# Patient Record
Sex: FEMALE | Race: BLACK OR AFRICAN AMERICAN | NOT HISPANIC OR LATINO | Employment: STUDENT | URBAN - METROPOLITAN AREA
[De-identification: names, ages, dates, MRNs, and addresses within clinical notes are randomized per-mention and may not be internally consistent; named-entity substitution may affect disease eponyms.]

---
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
## 2022-03-15 ENCOUNTER — TELEPHONE (OUTPATIENT)
Dept: OBSTETRICS AND GYNECOLOGY | Facility: CLINIC | Age: 22
End: 2022-03-15
Payer: COMMERCIAL

## 2022-03-15 NOTE — TELEPHONE ENCOUNTER
norelgestromin-ethinyl estradiol (ORTHO EVRA) 150-35 mcg/24 hr 3 patch 8 8/17/2021 8/17/2022    Sig - Route: Place 1 patch onto the skin once a week. - Transdermal    Sent to pharmacy as: norelgestromin-ethinyl estradiol (ORTHO EVRA) 150-35 mcg/24 hr    E-Prescribing Status: Receipt confirmed by pharmacy (8/17/2021  9:06 AM CDT)    Associated Diagnoses    Encounter for initial prescription of transdermal patch hormonal contraceptive device  - Primary         Pharmacy    [id number] - [place], [place] - [address] AT [facility] OF [address]     Called pharmacy and script will be filled through May.

## 2022-03-15 NOTE — TELEPHONE ENCOUNTER
----- Message from Amira Hills sent at 3/15/2022 11:32 AM CDT -----  Pt is calling for a 90 day refill of birth control (xulane. Call back number is [phone]. Thx. El

## 2022-03-15 NOTE — TELEPHONE ENCOUNTER
Called patient and pharmacy in [place] telling her she does not have refills.  Prescription sent in August with refills.  Will call pharmacy.  Scheduled appointment for annual in May.

## 2022-05-10 ENCOUNTER — TELEPHONE (OUTPATIENT)
Dept: OBSTETRICS AND GYNECOLOGY | Facility: CLINIC | Age: 22
End: 2022-05-10
Payer: COMMERCIAL

## 2022-05-10 ENCOUNTER — PATIENT MESSAGE (OUTPATIENT)
Dept: OBSTETRICS AND GYNECOLOGY | Facility: CLINIC | Age: 22
End: 2022-05-10
Payer: COMMERCIAL

## 2022-05-10 NOTE — TELEPHONE ENCOUNTER
Called pt to inform appointment sched 5/16 will have to be rescheduled dur to Dr. Remy being out of the office. No answer and vm is full will send message via patient portal as well.

## 2022-05-17 ENCOUNTER — OFFICE VISIT (OUTPATIENT)
Dept: OBSTETRICS AND GYNECOLOGY | Facility: CLINIC | Age: 22
End: 2022-05-17
Payer: COMMERCIAL

## 2022-05-17 ENCOUNTER — LAB VISIT (OUTPATIENT)
Dept: LAB | Facility: HOSPITAL | Age: 22
End: 2022-05-17
Attending: NURSE PRACTITIONER
Payer: COMMERCIAL

## 2022-05-17 VITALS
WEIGHT: 102.06 LBS | DIASTOLIC BLOOD PRESSURE: 72 MMHG | SYSTOLIC BLOOD PRESSURE: 98 MMHG | BODY MASS INDEX: 18.08 KG/M2 | HEIGHT: 63 IN

## 2022-05-17 DIAGNOSIS — Z01.419 ENCOUNTER FOR GYNECOLOGICAL EXAMINATION WITHOUT ABNORMAL FINDING: Primary | ICD-10-CM

## 2022-05-17 DIAGNOSIS — Z23 NEED FOR HPV VACCINATION: ICD-10-CM

## 2022-05-17 DIAGNOSIS — Z01.419 ENCOUNTER FOR GYNECOLOGICAL EXAMINATION WITHOUT ABNORMAL FINDING: ICD-10-CM

## 2022-05-17 DIAGNOSIS — Z30.45 ENCOUNTER FOR SURVEILLANCE OF TRANSDERMAL PATCH HORMONAL CONTRACEPTIVE DEVICE: ICD-10-CM

## 2022-05-17 DIAGNOSIS — Z11.3 ENCOUNTER FOR SCREENING FOR INFECTIONS WITH PREDOMINANTLY SEXUAL MODE OF TRANSMISSION: ICD-10-CM

## 2022-05-17 PROBLEM — E55.9 VITAMIN D DEFICIENCY: Status: ACTIVE | Noted: 2021-04-30

## 2022-05-17 PROBLEM — G43.909 MIGRAINE: Status: ACTIVE | Noted: 2022-05-17

## 2022-05-17 PROCEDURE — 1159F PR MEDICATION LIST DOCUMENTED IN MEDICAL RECORD: ICD-10-PCS | Mod: CPTII,S$GLB,, | Performed by: NURSE PRACTITIONER

## 2022-05-17 PROCEDURE — 87389 HIV-1 AG W/HIV-1&-2 AB AG IA: CPT | Performed by: NURSE PRACTITIONER

## 2022-05-17 PROCEDURE — 3078F PR MOST RECENT DIASTOLIC BLOOD PRESSURE < 80 MM HG: ICD-10-PCS | Mod: CPTII,S$GLB,, | Performed by: NURSE PRACTITIONER

## 2022-05-17 PROCEDURE — 90651 9VHPV VACCINE 2/3 DOSE IM: CPT | Mod: S$GLB,,, | Performed by: NURSE PRACTITIONER

## 2022-05-17 PROCEDURE — 99395 PREV VISIT EST AGE 18-39: CPT | Mod: 25,S$GLB,, | Performed by: NURSE PRACTITIONER

## 2022-05-17 PROCEDURE — 36415 COLL VENOUS BLD VENIPUNCTURE: CPT | Mod: PO | Performed by: NURSE PRACTITIONER

## 2022-05-17 PROCEDURE — 3074F PR MOST RECENT SYSTOLIC BLOOD PRESSURE < 130 MM HG: ICD-10-PCS | Mod: CPTII,S$GLB,, | Performed by: NURSE PRACTITIONER

## 2022-05-17 PROCEDURE — 99999 PR PBB SHADOW E&M-EST. PATIENT-LVL III: ICD-10-PCS | Mod: PBBFAC,,, | Performed by: NURSE PRACTITIONER

## 2022-05-17 PROCEDURE — 99999 PR PBB SHADOW E&M-EST. PATIENT-LVL III: CPT | Mod: PBBFAC,,, | Performed by: NURSE PRACTITIONER

## 2022-05-17 PROCEDURE — 80074 ACUTE HEPATITIS PANEL: CPT | Performed by: NURSE PRACTITIONER

## 2022-05-17 PROCEDURE — 87491 CHLMYD TRACH DNA AMP PROBE: CPT | Performed by: NURSE PRACTITIONER

## 2022-05-17 PROCEDURE — 90471 HPV VACCINE 9-VALENT 3 DOSE IM: ICD-10-PCS | Mod: S$GLB,,, | Performed by: NURSE PRACTITIONER

## 2022-05-17 PROCEDURE — 90651 HPV VACCINE 9-VALENT 3 DOSE IM: ICD-10-PCS | Mod: S$GLB,,, | Performed by: NURSE PRACTITIONER

## 2022-05-17 PROCEDURE — 1159F MED LIST DOCD IN RCRD: CPT | Mod: CPTII,S$GLB,, | Performed by: NURSE PRACTITIONER

## 2022-05-17 PROCEDURE — 3008F BODY MASS INDEX DOCD: CPT | Mod: CPTII,S$GLB,, | Performed by: NURSE PRACTITIONER

## 2022-05-17 PROCEDURE — 99395 PR PREVENTIVE VISIT,EST,18-39: ICD-10-PCS | Mod: 25,S$GLB,, | Performed by: NURSE PRACTITIONER

## 2022-05-17 PROCEDURE — 90471 IMMUNIZATION ADMIN: CPT | Mod: S$GLB,,, | Performed by: NURSE PRACTITIONER

## 2022-05-17 PROCEDURE — 3008F PR BODY MASS INDEX (BMI) DOCUMENTED: ICD-10-PCS | Mod: CPTII,S$GLB,, | Performed by: NURSE PRACTITIONER

## 2022-05-17 PROCEDURE — 3078F DIAST BP <80 MM HG: CPT | Mod: CPTII,S$GLB,, | Performed by: NURSE PRACTITIONER

## 2022-05-17 PROCEDURE — 3074F SYST BP LT 130 MM HG: CPT | Mod: CPTII,S$GLB,, | Performed by: NURSE PRACTITIONER

## 2022-05-17 PROCEDURE — 87591 N.GONORRHOEAE DNA AMP PROB: CPT | Performed by: NURSE PRACTITIONER

## 2022-05-17 PROCEDURE — 86592 SYPHILIS TEST NON-TREP QUAL: CPT | Performed by: NURSE PRACTITIONER

## 2022-05-17 RX ORDER — NORELGESTROMIN AND ETHINYL ESTRADIOL 35; 150 UG/MG; UG/MG
1 PATCH TRANSDERMAL WEEKLY
Qty: 9 PATCH | Refills: 4 | Status: SHIPPED | OUTPATIENT
Start: 2022-05-17 | End: 2023-03-20

## 2022-05-17 NOTE — PROGRESS NOTES
After identifying patient with 2 identifiers, verifying that patient wished to receive gardasil hpv vaccine, reviewing allergies, gardasil  administered to left deltoid.   Patient tolerated well.  Patient instructed to wait 15 minutes and verbalized understanding.

## 2022-05-17 NOTE — PROGRESS NOTES
Subjective:       Patient ID: Harini Mcbride is a 21 y.o. female.    Chief Complaint:  Well Woman      History of Present Illness  HPI  Annual Exam-Premenopausal  G0 presents for annual exam. The patient has no complaints today. The patient is sexually active since November; x1 partner; MM relationship; no condoms.  Does desire STD testing today; needs refill on her patches. GYN screening history: no prior history of gyn screening tests. The patient wears seatbelts: yes. The patient participates in regular exercise: no. Has the patient ever been transfused or tattooed?: yes. x5 tattoos.  The patient reports that there is not domestic violence in her life.    Monthly menses the week of no patch x5d; moderate in the middle; pads-overnight changing q5-6h for cleanliness; cramping first two days; second day worst. No meds. Heating pad with relief.      Two gardasil doses; needs third dose.    ArthroCAD major at [employer]    GYN & OB History  Patient's last menstrual period was 2022 (approximate).   Date of Last Pap: No result found    OB History    Para Term  AB Living   0 0 0 0 0 0   SAB IAB Ectopic Multiple Live Births   0 0 0 0 0       Review of Systems  Review of Systems   Constitutional: Negative for activity change, appetite change, chills, fatigue and fever.   HENT: Negative for nasal congestion and mouth sores.    Respiratory: Negative for cough, shortness of breath and wheezing.    Cardiovascular: Negative for chest pain.   Gastrointestinal: Negative for abdominal pain, constipation, diarrhea, nausea and vomiting.   Endocrine: Negative for hair loss and hot flashes.   Genitourinary: Positive for dysmenorrhea. Negative for bladder incontinence, decreased libido, dyspareunia, dysuria, frequency, genital sores, menorrhagia, menstrual problem, pelvic pain, urgency, vaginal discharge, vaginal pain, urinary incontinence, postcoital bleeding and vaginal odor.   Musculoskeletal:  Negative for back pain.   Integumentary:  Negative for breast mass, nipple discharge, breast skin changes and breast tenderness.   Neurological: Negative for headaches.   Hematological: Negative for adenopathy.   Psychiatric/Behavioral: Negative for depression. The patient is not nervous/anxious.    All other systems reviewed and are negative.  Breast: Negative for breast self exam, lump, mass, mastodynia, nipple discharge, skin changes and tenderness          Objective:      Physical Exam:   Constitutional: She is oriented to person, place, and time. She appears well-developed and well-nourished. No distress.    HENT:   Head: Normocephalic and atraumatic.   Nose: Nose normal.    Eyes: Pupils are equal, round, and reactive to light. Conjunctivae and EOM are normal. Right eye exhibits no discharge. Left eye exhibits no discharge.     Cardiovascular: Normal rate, regular rhythm and normal heart sounds.  Exam reveals no gallop, no friction rub, no clubbing, no cyanosis and no edema.    No murmur heard.   Pulmonary/Chest: Effort normal and breath sounds normal. No respiratory distress. She has no decreased breath sounds. She has no wheezes. She has no rhonchi. She has no rales. She exhibits no tenderness. Right breast exhibits no inverted nipple, no mass, no nipple discharge, no skin change, no tenderness, no bleeding and no swelling. Left breast exhibits no inverted nipple, no mass, no nipple discharge, no skin change, no tenderness, no bleeding and no swelling. Breasts are symmetrical.        Abdominal: Soft. Bowel sounds are normal. She exhibits no distension. There is no abdominal tenderness. There is no rebound and no guarding. Hernia confirmed negative in the right inguinal area and confirmed negative in the left inguinal area.     Genitourinary:    Inguinal canal, vagina, uterus, right adnexa and left adnexa normal.   Rectum:      No external hemorrhoid.      Pelvic exam was performed with patient supine.   The  external female genitalia was normal.   No external genitalia lesions identified,Genitalia hair distrobution normal .   Labial bartholins normal.There is no rash, tenderness, lesion or injury on the right labia. There is no rash, tenderness, lesion or injury on the left labia. Cervix is normal. Right adnexum displays no mass, no tenderness and no fullness. Left adnexum displays no mass, no tenderness and no fullness. No erythema,  no vaginal discharge, tenderness or bleeding in the vagina.    No foreign body in the vagina.      No signs of injury in the vagina.   Vagina was moist.Cervix exhibits no motion tenderness, no lesion, no discharge, no friability, no lesion, no tenderness and no polyp.    pap smear completedUerus contour normal  Uterus is not enlarged and not tender. Uterus size: 6 cm.Normal urethral meatus.Urethral Meatus exhibits: urethral lesion and prolapsedUrethra findings: no urethral mass, no tenderness and no urethral scarringBladder findings: no bladder distention and no bladder tenderness          Musculoskeletal: Normal range of motion and moves all extremeties.      Lymphadenopathy: No inguinal adenopathy noted on the right or left side.    Neurological: She is alert and oriented to person, place, and time.    Skin: Skin is warm and dry. No rash noted. She is not diaphoretic. No cyanosis or erythema. No pallor. Nails show no clubbing.    Psychiatric: She has a normal mood and affect. Her speech is normal and behavior is normal. Judgment and thought content normal.             Assessment:        1. Encounter for gynecological examination without abnormal finding    2. Encounter for surveillance of transdermal patch hormonal contraceptive device    3. Need for HPV vaccination    4. Encounter for screening for infections with predominantly sexual mode of transmission               Plan:   Discussed risks of DVT development with birth control use.  Pt denies history of blood clots, DVT, cardiac  issues, HTN, CVA, gallbladder disease, liver disease, smoking, migraines with an aura, or adrenal disease.  Pt denies family hx of DVT.    Refill sent on patches    Safe sex practices discussed.    Educated pt on HPV and Gardasil vaccine; last injection given today.  Safe sex practices discussed.     Reviewed updated recommendations for pap smears (every 3 years) in low risk patients.  Recommend annual pelvic exams.  Reviewed recommendations for annual CBE.  First pap at 22yo.   RTC 1 year or sooner prn.  To PCP for other health maintenance.      Encounter for gynecological examination without abnormal finding  -     HIV 1/2 Ag/Ab (4th Gen); Future; Expected date: 05/17/2022  -     RPR; Future; Expected date: 05/17/2022  -     Hepatitis Panel, Acute; Future; Expected date: 05/17/2022  -     C. trachomatis/N. gonorrhoeae by AMP DNA    Encounter for surveillance of transdermal patch hormonal contraceptive device  -     norelgestromin-ethinyl estradiol (ORTHO EVRA) 150-35 mcg/24 hr; Place 1 patch onto the skin once a week.  Dispense: 9 patch; Refill: 4    Need for HPV vaccination  -     (In Office Administered) HPV Vaccine (9-Valent) (3 Dose) (IM)    Encounter for screening for infections with predominantly sexual mode of transmission  -     HIV 1/2 Ag/Ab (4th Gen); Future; Expected date: 05/17/2022  -     RPR; Future; Expected date: 05/17/2022  -     Hepatitis Panel, Acute; Future; Expected date: 05/17/2022  -     C. trachomatis/N. gonorrhoeae by AMP DNA

## 2022-05-18 LAB
C TRACH DNA SPEC QL NAA+PROBE: NOT DETECTED
N GONORRHOEA DNA SPEC QL NAA+PROBE: NOT DETECTED
RPR SER QL: NORMAL

## 2022-05-20 LAB
HAV IGM SERPL QL IA: NEGATIVE
HBV CORE IGM SERPL QL IA: NEGATIVE
HBV SURFACE AG SERPL QL IA: NEGATIVE
HCV AB SERPL QL IA: NEGATIVE
HIV 1+2 AB+HIV1 P24 AG SERPL QL IA: NEGATIVE

## 2022-11-28 ENCOUNTER — OFFICE VISIT (OUTPATIENT)
Dept: OBSTETRICS AND GYNECOLOGY | Facility: CLINIC | Age: 22
End: 2022-11-28
Payer: COMMERCIAL

## 2022-11-28 VITALS
SYSTOLIC BLOOD PRESSURE: 118 MMHG | DIASTOLIC BLOOD PRESSURE: 72 MMHG | BODY MASS INDEX: 17.36 KG/M2 | WEIGHT: 98 LBS | HEIGHT: 63 IN

## 2022-11-28 DIAGNOSIS — N76.0 ACUTE VAGINITIS: Primary | ICD-10-CM

## 2022-11-28 PROCEDURE — 3074F SYST BP LT 130 MM HG: CPT | Mod: CPTII,S$GLB,, | Performed by: NURSE PRACTITIONER

## 2022-11-28 PROCEDURE — 3078F DIAST BP <80 MM HG: CPT | Mod: CPTII,S$GLB,, | Performed by: NURSE PRACTITIONER

## 2022-11-28 PROCEDURE — 3078F PR MOST RECENT DIASTOLIC BLOOD PRESSURE < 80 MM HG: ICD-10-PCS | Mod: CPTII,S$GLB,, | Performed by: NURSE PRACTITIONER

## 2022-11-28 PROCEDURE — 1160F PR REVIEW ALL MEDS BY PRESCRIBER/CLIN PHARMACIST DOCUMENTED: ICD-10-PCS | Mod: CPTII,S$GLB,, | Performed by: NURSE PRACTITIONER

## 2022-11-28 PROCEDURE — 99213 PR OFFICE/OUTPT VISIT, EST, LEVL III, 20-29 MIN: ICD-10-PCS | Mod: S$GLB,,, | Performed by: NURSE PRACTITIONER

## 2022-11-28 PROCEDURE — 3074F PR MOST RECENT SYSTOLIC BLOOD PRESSURE < 130 MM HG: ICD-10-PCS | Mod: CPTII,S$GLB,, | Performed by: NURSE PRACTITIONER

## 2022-11-28 PROCEDURE — 99999 PR PBB SHADOW E&M-EST. PATIENT-LVL III: CPT | Mod: PBBFAC,,, | Performed by: NURSE PRACTITIONER

## 2022-11-28 PROCEDURE — 3008F BODY MASS INDEX DOCD: CPT | Mod: CPTII,S$GLB,, | Performed by: NURSE PRACTITIONER

## 2022-11-28 PROCEDURE — 81514 NFCT DS BV&VAGINITIS DNA ALG: CPT | Performed by: NURSE PRACTITIONER

## 2022-11-28 PROCEDURE — 99213 OFFICE O/P EST LOW 20 MIN: CPT | Mod: S$GLB,,, | Performed by: NURSE PRACTITIONER

## 2022-11-28 PROCEDURE — 3008F PR BODY MASS INDEX (BMI) DOCUMENTED: ICD-10-PCS | Mod: CPTII,S$GLB,, | Performed by: NURSE PRACTITIONER

## 2022-11-28 PROCEDURE — 1160F RVW MEDS BY RX/DR IN RCRD: CPT | Mod: CPTII,S$GLB,, | Performed by: NURSE PRACTITIONER

## 2022-11-28 PROCEDURE — 1159F PR MEDICATION LIST DOCUMENTED IN MEDICAL RECORD: ICD-10-PCS | Mod: CPTII,S$GLB,, | Performed by: NURSE PRACTITIONER

## 2022-11-28 PROCEDURE — 87491 CHLMYD TRACH DNA AMP PROBE: CPT | Performed by: NURSE PRACTITIONER

## 2022-11-28 PROCEDURE — 99999 PR PBB SHADOW E&M-EST. PATIENT-LVL III: ICD-10-PCS | Mod: PBBFAC,,, | Performed by: NURSE PRACTITIONER

## 2022-11-28 PROCEDURE — 1159F MED LIST DOCD IN RCRD: CPT | Mod: CPTII,S$GLB,, | Performed by: NURSE PRACTITIONER

## 2022-11-28 PROCEDURE — 87591 N.GONORRHOEAE DNA AMP PROB: CPT | Performed by: NURSE PRACTITIONER

## 2022-11-28 NOTE — PROGRESS NOTES
Subjective:       Patient ID: Harini Mcbride is a 21 y.o. female.    Chief Complaint:  Vaginitis      History of Present Illness  G0 present for multiple complaints  C/o vaginal discharge third or fourth yeast infection since 12/2021  X1 partner; no condoms lately  Showers with dove bar  White, milky discharge; itching and burning  No odor  Urgent care sent diflucan with temporary relief  Wondering about pregnancy  Applied patch too soon and menses was intermittent  Last noticed spotting yesterday with cramping  Denies any other pregnancy sx; x2 neg home UPTs  Desires beta today      GYN & OB History  Patient's last menstrual period was 2022.   Date of Last Pap: No result found    OB History    Para Term  AB Living   0 0 0 0 0 0   SAB IAB Ectopic Multiple Live Births   0 0 0 0 0       Review of Systems  Review of Systems   Constitutional:  Negative for chills, fatigue and fever.   Gastrointestinal:  Positive for constipation. Negative for abdominal pain, diarrhea, nausea and vomiting.   Genitourinary:  Positive for dysuria, menstrual problem, vaginal discharge and vaginal odor. Negative for dysmenorrhea, flank pain, frequency, hematuria, menorrhagia, pelvic pain, urgency, vaginal pain and urinary incontinence.   Musculoskeletal:  Negative for back pain.   Integumentary:  Negative for rash and breast tenderness.   Neurological:  Negative for headaches.   Psychiatric/Behavioral:  Negative for sleep disturbance.    All other systems reviewed and are negative.  Breast: Negative for tenderness        Objective:      Physical Exam:   Constitutional: She is oriented to person, place, and time. She appears well-developed and well-nourished. No distress.    HENT:   Head: Normocephalic and atraumatic.    Eyes: Pupils are equal, round, and reactive to light. Conjunctivae and EOM are normal.     Cardiovascular:  Normal rate.             Pulmonary/Chest: Effort normal.        Abdominal: Soft. She  exhibits no distension. There is no abdominal tenderness. There is no rebound and no guarding. Hernia confirmed negative in the right inguinal area and confirmed negative in the left inguinal area.     Genitourinary:    Inguinal canal normal.   Rectum:      No external hemorrhoid.      Pelvic exam was performed with patient supine.   The external female genitalia was normal.   No external genitalia lesions identified,Genitalia hair distrobution normal .   Labial bartholins normal.There is no rash, tenderness, lesion or injury on the right labia. There is no rash, tenderness, lesion or injury on the left labia. There is vaginal discharge (cloudy, thin discharge; no odor) in the vagina. No erythema, tenderness or bleeding in the vagina.    No foreign body in the vagina.      No signs of injury in the vagina.   Normal urethral meatus.Urethral Meatus exhibits: urethral lesion and prolapsedUrethra findings: no urethral mass, no tenderness and no urethral scarring   Genitourinary Comments: Bimanual deferred per pt             Musculoskeletal: Normal range of motion and moves all extremeties.      Lymphadenopathy: No inguinal adenopathy noted on the right or left side.    Neurological: She is alert and oriented to person, place, and time.    Skin: Skin is warm and dry. No rash noted. She is not diaphoretic. No erythema. No pallor.    Psychiatric: She has a normal mood and affect. Her behavior is normal. Judgment and thought content normal.           Assessment:        1. Acute vaginitis               Plan:   Apply the patch to clean, dry skin on the Sunday after menstruation starts with one patch weekly alternating sites, avoiding breasts.  Fourth week no path.  Safe sex practices discussed.  Vaginal hygiene practices discussed.  Too soon for upt; offered beta; pt declines today would like to wait; will let me know    G/c and affirm collected    Continue annual well woman exam.    I spent a total of 20 minutes on the day  of the visit.This includes face to face time and non-face to face time preparing to see the patient (eg, review of tests), Obtaining and/or reviewing separately obtained history, Documenting clinical information in the electronic or other health record, Independently interpreting resultsand communicating results to the patient/family/caregiver, or Care coordination.      Acute vaginitis  -     Vaginosis Screen by DNA Probe  -     C. trachomatis/N. gonorrhoeae by AMP DNA

## 2022-11-30 LAB
BACTERIAL VAGINOSIS DNA: NEGATIVE
C TRACH DNA SPEC QL NAA+PROBE: NOT DETECTED
CANDIDA GLABRATA DNA: NEGATIVE
CANDIDA KRUSEI DNA: NEGATIVE
CANDIDA RRNA VAG QL PROBE: NEGATIVE
N GONORRHOEA DNA SPEC QL NAA+PROBE: NOT DETECTED
T VAGINALIS RRNA GENITAL QL PROBE: NEGATIVE

## 2023-03-19 DIAGNOSIS — Z30.45 ENCOUNTER FOR SURVEILLANCE OF TRANSDERMAL PATCH HORMONAL CONTRACEPTIVE DEVICE: ICD-10-CM

## 2023-03-20 RX ORDER — NORELGESTROMIN AND ETHINYL ESTRADIOL 150; 35 UG/D; UG/D
PATCH TRANSDERMAL
Qty: 9 PATCH | Refills: 0 | Status: SHIPPED | OUTPATIENT
Start: 2023-03-20 | End: 2023-05-31 | Stop reason: SDUPTHER

## 2023-03-21 NOTE — TELEPHONE ENCOUNTER
Called pt, confirmed pt identifiers scheduled pt for annual exam 5/22 at [facility], pt verbalized understanding.

## 2023-05-23 ENCOUNTER — TELEPHONE (OUTPATIENT)
Dept: OBSTETRICS AND GYNECOLOGY | Facility: CLINIC | Age: 23
End: 2023-05-23
Payer: COMMERCIAL

## 2023-05-23 NOTE — TELEPHONE ENCOUNTER
----- Message from Kobi Craven sent at 5/23/2023 11:22 AM CDT -----  Contact: [phone]  Pt is calling in regards to seeing if she still needs to keep her appt. Pt stated that her cycle is beginning to start. Please call pt back at [phone]. Thanks KB

## 2023-05-31 ENCOUNTER — OFFICE VISIT (OUTPATIENT)
Dept: OBSTETRICS AND GYNECOLOGY | Facility: CLINIC | Age: 23
End: 2023-05-31
Payer: COMMERCIAL

## 2023-05-31 VITALS
HEIGHT: 63 IN | DIASTOLIC BLOOD PRESSURE: 72 MMHG | SYSTOLIC BLOOD PRESSURE: 110 MMHG | BODY MASS INDEX: 18.08 KG/M2 | WEIGHT: 102.06 LBS

## 2023-05-31 DIAGNOSIS — Z01.419 ENCOUNTER FOR GYNECOLOGICAL EXAMINATION WITHOUT ABNORMAL FINDING: Primary | ICD-10-CM

## 2023-05-31 DIAGNOSIS — N76.0 ACUTE VAGINITIS: ICD-10-CM

## 2023-05-31 DIAGNOSIS — Z30.45 ENCOUNTER FOR SURVEILLANCE OF TRANSDERMAL PATCH HORMONAL CONTRACEPTIVE DEVICE: ICD-10-CM

## 2023-05-31 DIAGNOSIS — Z11.3 ENCOUNTER FOR SCREENING FOR INFECTIONS WITH PREDOMINANTLY SEXUAL MODE OF TRANSMISSION: ICD-10-CM

## 2023-05-31 PROCEDURE — 81514 NFCT DS BV&VAGINITIS DNA ALG: CPT | Performed by: NURSE PRACTITIONER

## 2023-05-31 PROCEDURE — 1159F MED LIST DOCD IN RCRD: CPT | Mod: CPTII,S$GLB,, | Performed by: NURSE PRACTITIONER

## 2023-05-31 PROCEDURE — 99999 PR PBB SHADOW E&M-EST. PATIENT-LVL III: CPT | Mod: PBBFAC,,, | Performed by: NURSE PRACTITIONER

## 2023-05-31 PROCEDURE — 3008F PR BODY MASS INDEX (BMI) DOCUMENTED: ICD-10-PCS | Mod: CPTII,S$GLB,, | Performed by: NURSE PRACTITIONER

## 2023-05-31 PROCEDURE — 99999 PR PBB SHADOW E&M-EST. PATIENT-LVL III: ICD-10-PCS | Mod: PBBFAC,,, | Performed by: NURSE PRACTITIONER

## 2023-05-31 PROCEDURE — 3074F SYST BP LT 130 MM HG: CPT | Mod: CPTII,S$GLB,, | Performed by: NURSE PRACTITIONER

## 2023-05-31 PROCEDURE — 87591 N.GONORRHOEAE DNA AMP PROB: CPT | Performed by: NURSE PRACTITIONER

## 2023-05-31 PROCEDURE — 99395 PR PREVENTIVE VISIT,EST,18-39: ICD-10-PCS | Mod: S$GLB,,, | Performed by: NURSE PRACTITIONER

## 2023-05-31 PROCEDURE — 3078F PR MOST RECENT DIASTOLIC BLOOD PRESSURE < 80 MM HG: ICD-10-PCS | Mod: CPTII,S$GLB,, | Performed by: NURSE PRACTITIONER

## 2023-05-31 PROCEDURE — 1159F PR MEDICATION LIST DOCUMENTED IN MEDICAL RECORD: ICD-10-PCS | Mod: CPTII,S$GLB,, | Performed by: NURSE PRACTITIONER

## 2023-05-31 PROCEDURE — 1160F PR REVIEW ALL MEDS BY PRESCRIBER/CLIN PHARMACIST DOCUMENTED: ICD-10-PCS | Mod: CPTII,S$GLB,, | Performed by: NURSE PRACTITIONER

## 2023-05-31 PROCEDURE — 3008F BODY MASS INDEX DOCD: CPT | Mod: CPTII,S$GLB,, | Performed by: NURSE PRACTITIONER

## 2023-05-31 PROCEDURE — 99395 PREV VISIT EST AGE 18-39: CPT | Mod: S$GLB,,, | Performed by: NURSE PRACTITIONER

## 2023-05-31 PROCEDURE — 3074F PR MOST RECENT SYSTOLIC BLOOD PRESSURE < 130 MM HG: ICD-10-PCS | Mod: CPTII,S$GLB,, | Performed by: NURSE PRACTITIONER

## 2023-05-31 PROCEDURE — 3078F DIAST BP <80 MM HG: CPT | Mod: CPTII,S$GLB,, | Performed by: NURSE PRACTITIONER

## 2023-05-31 PROCEDURE — 1160F RVW MEDS BY RX/DR IN RCRD: CPT | Mod: CPTII,S$GLB,, | Performed by: NURSE PRACTITIONER

## 2023-05-31 RX ORDER — NORELGESTROMIN AND ETHINYL ESTRADIOL 150; 35 UG/D; UG/D
PATCH TRANSDERMAL
Qty: 9 PATCH | Refills: 3 | Status: SHIPPED | OUTPATIENT
Start: 2023-05-31 | End: 2023-10-23

## 2023-05-31 NOTE — PROGRESS NOTES
Subjective:       Patient ID: Harini Mcbride is a 22 y.o. female.    Chief Complaint:  No chief complaint on file.      History of Present Illness  HPI  Annual Exam-Premenopausal  G0 presents for annual exam. The patient has complaints today of recurrent yeast.  Recently changed her detergent, but has been going on for months.  Showers with dove bar; cotton underwear.  Yesterday with intercourse felt weird and when she voided like infection was present; unable to describe.  Azo yeast with relief and also takes axo probiotic.  Does consume rice and pastas.    The patient is sexually active with usual partner they are monogamous; no other issues with intercourse. Does desire STD testing.  GYN screening history: no prior history of gyn screening tests. The patient wears seatbelts: yes. The patient participates in regular exercise: no. Has the patient ever been transfused or tattooed?: yes. Tattoos.  X2 new tattoos since last annual.  The patient reports that there is not domestic violence in her life.    Monthly menses the week of no patch x5-6d; medium first two days then very light; cramping ibuprofen, tylenol or rest with relief.  Needs refill on patch.      Student at [employer]; taking a break for the summer;  at [employer]    GYN & OB History  Patient's last menstrual period was 2023.   Date of Last Pap: No result found    OB History    Para Term  AB Living   0 0 0 0 0 0   SAB IAB Ectopic Multiple Live Births   0 0 0 0 0       Review of Systems  Review of Systems   Constitutional:  Negative for activity change, appetite change, chills, fatigue and fever.   HENT:  Negative for nasal congestion and mouth sores.    Respiratory:  Negative for cough, shortness of breath and wheezing.    Cardiovascular:  Negative for chest pain.   Gastrointestinal:  Negative for abdominal pain, constipation, diarrhea, nausea and vomiting.   Endocrine: Negative for hair loss and hot flashes.   Genitourinary:   Positive for dysmenorrhea. Negative for bladder incontinence, decreased libido, dyspareunia, dysuria, frequency, genital sores, hematuria, hot flashes, menorrhagia, menstrual problem, pelvic pain, urgency, vaginal discharge, vaginal pain, urinary incontinence, postcoital bleeding, postmenopausal bleeding, vaginal dryness and vaginal odor.        +vaginal irritation   Musculoskeletal:  Negative for back pain.   Integumentary:  Negative for breast mass, nipple discharge, breast skin changes and breast tenderness.   Neurological:  Negative for headaches.   Hematological:  Negative for adenopathy.   Psychiatric/Behavioral:  Negative for depression and sleep disturbance. The patient is not nervous/anxious.    All other systems reviewed and are negative.  Breast: Negative for breast self exam, lump, mass, mastodynia, nipple discharge, skin changes and tenderness        Objective:      Physical Exam:   Constitutional: She is oriented to person, place, and time. She appears well-developed and well-nourished. No distress.    HENT:   Head: Normocephalic and atraumatic.   Nose: Nose normal.    Eyes: Pupils are equal, round, and reactive to light. Conjunctivae and EOM are normal. Right eye exhibits no discharge. Left eye exhibits no discharge.     Cardiovascular:  Normal rate, regular rhythm and normal heart sounds.      Exam reveals no gallop, no friction rub, no clubbing, no cyanosis and no edema.       No murmur heard.   Pulmonary/Chest: Effort normal and breath sounds normal. No respiratory distress. She has no decreased breath sounds. She has no wheezes. She has no rhonchi. She has no rales. She exhibits no tenderness. Right breast exhibits no inverted nipple, no mass, no nipple discharge, no skin change, no tenderness, no bleeding and no swelling. Left breast exhibits no inverted nipple, no mass, no nipple discharge, no skin change, no tenderness, no bleeding and no swelling. Breasts are symmetrical.        Abdominal:  Soft. Bowel sounds are normal. She exhibits no distension. There is no abdominal tenderness. There is no rebound and no guarding. Hernia confirmed negative in the right inguinal area and confirmed negative in the left inguinal area.     Genitourinary:    Inguinal canal, vagina, uterus, right adnexa and left adnexa normal.   Rectum:      No external hemorrhoid.      Pelvic exam was performed with patient in the lithotomy position.   The external female genitalia was normal.   No external genitalia lesions identified,Genitalia hair distrobution normal .   Labial bartholins normal.There is no rash, tenderness, lesion or injury on the right labia. There is no rash, tenderness, lesion or injury on the left labia. Cervix is normal. Right adnexum displays no mass, no tenderness and no fullness. Left adnexum displays no mass, no tenderness and no fullness. No erythema,  no vaginal discharge, tenderness or bleeding in the vagina.    No foreign body in the vagina.      No signs of injury in the vagina.   Vagina was moist.Cervix exhibits no motion tenderness, no lesion, no discharge, no friability, no lesion, no tenderness and no polyp.    pap smear not completedUerus contour normal  Uterus is not enlarged and not tender. Uterus size: 6 cm.Normal urethral meatus.Urethral Meatus exhibits: urethral lesion and prolapsedUrethra findings: no urethral mass, no tenderness and no urethral scarringBladder findings: no bladder distention and no bladder tenderness          Musculoskeletal: Normal range of motion and moves all extremeties.      Lymphadenopathy: No inguinal adenopathy noted on the right or left side.    Neurological: She is alert and oriented to person, place, and time.    Skin: Skin is warm and dry. No rash noted. She is not diaphoretic. No cyanosis or erythema. No pallor. Nails show no clubbing.    Psychiatric: She has a normal mood and affect. Her speech is normal and behavior is normal. Judgment and thought content  normal.           Assessment:        1. Encounter for gynecological examination without abnormal finding    2. Encounter for surveillance of transdermal patch hormonal contraceptive device    3. Encounter for screening for infections with predominantly sexual mode of transmission    4. Acute vaginitis               Plan:   Labs and cx    Patient was counseled today on vaginitis prevention including :  a. avoiding feminine products such as deodorant soaps, body wash, bubble bath, douches, scented toilet paper, deodorant tampons or pads, feminine wipes, chronic pad use, etc.  b. avoiding other vulvovaginal irritants such as long hot baths, humidity, tight, synthetic clothing, chlorine and sitting around in wet bathing suits  c. wearing cotton underwear, avoiding thong underwear and no underwear to bed  d. taking showers instead of baths and use a hair dryer on cool setting afterwards to dry  e. wearing cotton to exercise and shower immediately after exercise and change clothes    Diet discussed    Discussed risks of DVT development with birth control use.  Pt denies history of blood clots, DVT, cardiac issues, HTN, CVA, gallbladder disease, liver disease, smoking, migraines with an aura, or adrenal disease.  Pt denies family hx of DVT.   Refill sent for patch    Reviewed updated recommendations for pap smears (every 3 years) in low risk patients.  Recommend annual pelvic exams.  Reviewed recommendations for annual CBE.  First pap at 24yo.  RTC 1 year or sooner prn.  To PCP for other health maintenance.      Encounter for gynecological examination without abnormal finding  -     norelgestromin-ethinyl estradiol (XULANE) 150-35 mcg/24 hr; APPLY 1 PATCH TOPICALLY TO THE SKIN 1 TIME A WEEK  Dispense: 9 patch; Refill: 3  -     HIV 1/2 Ag/Ab (4th Gen); Future; Expected date: 05/31/2023  -     RPR; Future; Expected date: 05/31/2023  -     Hepatitis Panel, Acute; Future; Expected date: 05/31/2023  -     C. trachomatis/N.  gonorrhoeae by AMP DNA    Encounter for surveillance of transdermal patch hormonal contraceptive device  -     norelgestromin-ethinyl estradiol (XULANE) 150-35 mcg/24 hr; APPLY 1 PATCH TOPICALLY TO THE SKIN 1 TIME A WEEK  Dispense: 9 patch; Refill: 3    Encounter for screening for infections with predominantly sexual mode of transmission  -     HIV 1/2 Ag/Ab (4th Gen); Future; Expected date: 05/31/2023  -     RPR; Future; Expected date: 05/31/2023  -     Hepatitis Panel, Acute; Future; Expected date: 05/31/2023  -     C. trachomatis/N. gonorrhoeae by AMP DNA    Acute vaginitis  -     Vaginosis Screen by DNA Probe

## 2023-06-01 ENCOUNTER — LAB VISIT (OUTPATIENT)
Dept: LAB | Facility: HOSPITAL | Age: 23
End: 2023-06-01
Attending: NURSE PRACTITIONER
Payer: COMMERCIAL

## 2023-06-01 DIAGNOSIS — Z01.419 ENCOUNTER FOR GYNECOLOGICAL EXAMINATION WITHOUT ABNORMAL FINDING: ICD-10-CM

## 2023-06-01 DIAGNOSIS — Z11.3 ENCOUNTER FOR SCREENING FOR INFECTIONS WITH PREDOMINANTLY SEXUAL MODE OF TRANSMISSION: ICD-10-CM

## 2023-06-01 LAB
BACTERIAL VAGINOSIS DNA: NEGATIVE
CANDIDA GLABRATA DNA: NEGATIVE
CANDIDA KRUSEI DNA: NEGATIVE
CANDIDA RRNA VAG QL PROBE: NEGATIVE
HIV 1+2 AB+HIV1 P24 AG SERPL QL IA: NORMAL
T VAGINALIS RRNA GENITAL QL PROBE: NEGATIVE

## 2023-06-01 PROCEDURE — 87389 HIV-1 AG W/HIV-1&-2 AB AG IA: CPT | Performed by: NURSE PRACTITIONER

## 2023-06-01 PROCEDURE — 36415 COLL VENOUS BLD VENIPUNCTURE: CPT | Mod: PN | Performed by: NURSE PRACTITIONER

## 2023-06-01 PROCEDURE — 80074 ACUTE HEPATITIS PANEL: CPT | Performed by: NURSE PRACTITIONER

## 2023-06-01 PROCEDURE — 86592 SYPHILIS TEST NON-TREP QUAL: CPT | Performed by: NURSE PRACTITIONER

## 2023-06-02 LAB
C TRACH DNA SPEC QL NAA+PROBE: NOT DETECTED
HAV IGM SERPL QL IA: NORMAL
HBV CORE IGM SERPL QL IA: NORMAL
HBV SURFACE AG SERPL QL IA: NORMAL
HCV AB SERPL QL IA: NORMAL
N GONORRHOEA DNA SPEC QL NAA+PROBE: NOT DETECTED
RPR SER QL: NORMAL

## 2023-08-11 ENCOUNTER — TELEPHONE (OUTPATIENT)
Dept: OBSTETRICS AND GYNECOLOGY | Facility: CLINIC | Age: 23
End: 2023-08-11
Payer: COMMERCIAL

## 2023-08-11 NOTE — TELEPHONE ENCOUNTER
Called pt confirmed pt identifiers informed pt that provider will not be in clinic Monday 8/14 so appt will need to be r/s pt stated she will have to wait until her [facility] schedule comes out [facility] verbalized understanding and told pt that I will cancel her appt until then pt verbalized understanding.

## 2023-08-16 ENCOUNTER — OFFICE VISIT (OUTPATIENT)
Dept: OBSTETRICS AND GYNECOLOGY | Facility: CLINIC | Age: 23
End: 2023-08-16
Payer: COMMERCIAL

## 2023-08-16 VITALS — BODY MASS INDEX: 18.16 KG/M2 | WEIGHT: 102.5 LBS

## 2023-08-16 DIAGNOSIS — N92.1 BREAKTHROUGH BLEEDING ON CONTRACEPTIVE PATCH: Primary | ICD-10-CM

## 2023-08-16 LAB
GARDNERELLA VAGINALIS: NORMAL
OTHER MICROSC. OBSERVATIONS: NORMAL
POC BACTERIAL VAGINOSIS: NORMAL
POC CLUE CELLS: NEGATIVE
TRICHOMONAS, POC: NEGATIVE
YEAST WET PREP: NEGATIVE

## 2023-08-16 PROCEDURE — 87210 PR  SMEAR,STAIN,WET MNT,INTERP: ICD-10-PCS | Mod: QW,S$GLB,, | Performed by: NURSE PRACTITIONER

## 2023-08-16 PROCEDURE — 99999 PR PBB SHADOW E&M-EST. PATIENT-LVL II: CPT | Mod: PBBFAC,,, | Performed by: NURSE PRACTITIONER

## 2023-08-16 PROCEDURE — 1159F MED LIST DOCD IN RCRD: CPT | Mod: CPTII,S$GLB,, | Performed by: NURSE PRACTITIONER

## 2023-08-16 PROCEDURE — 99999 PR PBB SHADOW E&M-EST. PATIENT-LVL II: ICD-10-PCS | Mod: PBBFAC,,, | Performed by: NURSE PRACTITIONER

## 2023-08-16 PROCEDURE — 1159F PR MEDICATION LIST DOCUMENTED IN MEDICAL RECORD: ICD-10-PCS | Mod: CPTII,S$GLB,, | Performed by: NURSE PRACTITIONER

## 2023-08-16 PROCEDURE — 1160F PR REVIEW ALL MEDS BY PRESCRIBER/CLIN PHARMACIST DOCUMENTED: ICD-10-PCS | Mod: CPTII,S$GLB,, | Performed by: NURSE PRACTITIONER

## 2023-08-16 PROCEDURE — 1160F RVW MEDS BY RX/DR IN RCRD: CPT | Mod: CPTII,S$GLB,, | Performed by: NURSE PRACTITIONER

## 2023-08-16 PROCEDURE — 87210 SMEAR WET MOUNT SALINE/INK: CPT | Mod: QW,S$GLB,, | Performed by: NURSE PRACTITIONER

## 2023-08-16 PROCEDURE — 3008F BODY MASS INDEX DOCD: CPT | Mod: CPTII,S$GLB,, | Performed by: NURSE PRACTITIONER

## 2023-08-16 PROCEDURE — 3008F PR BODY MASS INDEX (BMI) DOCUMENTED: ICD-10-PCS | Mod: CPTII,S$GLB,, | Performed by: NURSE PRACTITIONER

## 2023-08-16 PROCEDURE — 99212 OFFICE O/P EST SF 10 MIN: CPT | Mod: S$GLB,,, | Performed by: NURSE PRACTITIONER

## 2023-08-16 PROCEDURE — 99212 PR OFFICE/OUTPT VISIT, EST, LEVL II, 10-19 MIN: ICD-10-PCS | Mod: S$GLB,,, | Performed by: NURSE PRACTITIONER

## 2023-08-16 RX ORDER — METRONIDAZOLE 500 MG/1
500 TABLET ORAL 2 TIMES DAILY
COMMUNITY
Start: 2023-08-04

## 2023-08-16 NOTE — PROGRESS NOTES
Subjective:       Patient ID: Harini Mcbride is a 22 y.o. female.    Chief Complaint:  irregular bleeding      History of Present Illness  Vaginal Bleeding  The patient's pertinent negatives include no pelvic pain or vaginal discharge. This is a new problem. The current episode started 1 to 4 weeks ago. The problem occurs constantly. The problem has been waxing and waning. The pain is moderate. The problem affects both sides. She is not pregnant. Associated symptoms include abdominal pain, back pain, constipation, diarrhea, flank pain, headaches and urgency. Pertinent negatives include no anorexia, chills, discolored urine, dysuria, fever, frequency, hematuria, nausea, painful intercourse, rash or vomiting. The vaginal bleeding is heavier than menses. She has been passing clots. She has not been passing tissue. The symptoms are aggravated by intercourse. She has tried anti-fungal cream and warm bath for the symptoms. The treatment provided significant relief. She is sexually active. No, her partner does not have an STD. She uses a patch for contraception. Her menstrual history has been regular. There is no history of an abdominal surgery, a  section, an ectopic pregnancy, endometriosis, a gynecological surgery, herpes simplex, menorrhagia, metrorrhagia, miscarriage, ovarian cysts, perineal abscess, PID, an STD, a terminated pregnancy or vaginosis.     G0 present for BTB on patch  Using an old refill she had at home, but not   Had normal cycle   Then started bleeding again ; got heavy with large clots and cramping  Spotting today  Went to urgent care and tested for STDs -- WNL  ER  neg UPT but left r/t wait time -- can see encounter and labs under chart review  Reports she changes her patches regularly  Sexually active with usual partner    GYN & OB History  Patient's last menstrual period was 2023 (exact date).   Date of Last Pap: No result found    OB History    Para Term   AB Living   0 0 0 0 0 0   SAB IAB Ectopic Multiple Live Births   0 0 0 0 0       Review of Systems  Review of Systems   Constitutional:  Negative for chills and fever.   Gastrointestinal:  Positive for abdominal pain, constipation and diarrhea. Negative for anorexia, nausea and vomiting.   Genitourinary:  Positive for flank pain, menstrual problem, urgency and vaginal bleeding. Negative for dysuria, frequency, hematuria, menorrhagia, pelvic pain, vaginal discharge, vaginal pain and vaginal odor.   Musculoskeletal:  Positive for back pain.   Integumentary:  Negative for rash.   Neurological:  Positive for headaches.           Objective:      Physical Exam:   Constitutional: She is oriented to person, place, and time. She appears well-developed and well-nourished. No distress.    HENT:   Head: Normocephalic and atraumatic.    Eyes: Pupils are equal, round, and reactive to light. Conjunctivae and EOM are normal.     Cardiovascular:  Normal rate.             Pulmonary/Chest: Effort normal.        Abdominal: Soft. She exhibits no distension. There is no abdominal tenderness. There is no rebound and no guarding. Hernia confirmed negative in the right inguinal area and confirmed negative in the left inguinal area.     Genitourinary:    Inguinal canal, uterus, right adnexa and left adnexa normal.   Rectum:      No external hemorrhoid.      Pelvic exam was performed with patient in the lithotomy position.   The external female genitalia was normal.   No external genitalia lesions identified,Genitalia hair distrobution normal .   Labial bartholins normal.There is no rash, tenderness, lesion or injury on the right labia. There is no rash, tenderness, lesion or injury on the left labia. Cervix is normal. Right adnexum displays no mass, no tenderness and no fullness. Left adnexum displays no mass, no tenderness and no fullness. There is bleeding (scant amt brown) in the vagina. No erythema,  no vaginal discharge or  tenderness in the vagina.    No foreign body in the vagina.      No signs of injury in the vagina.   Cervix exhibits no motion tenderness, no lesion, no friability, no lesion, no tenderness and no polyp. Uerus contour normal  Uterus is not enlarged and not tender. Uterus size: 6 cm.Normal urethral meatus.Urethral Meatus exhibits: urethral lesion and prolapsedUrethra findings: no urethral mass, no tenderness and no urethral scarringBladder findings: no bladder distention and no bladder tenderness   Genitourinary Comments: Wet prep -- occasional clue cells; no yeast or trich noted             Musculoskeletal: Normal range of motion and moves all extremeties.      Lymphadenopathy: No inguinal adenopathy noted on the right or left side.    Neurological: She is alert and oriented to person, place, and time.    Skin: Skin is warm and dry. No rash noted. She is not diaphoretic. No erythema. No pallor.    Psychiatric: She has a normal mood and affect. Her behavior is normal. Judgment and thought content normal.             Assessment:        1. Breakthrough bleeding on contraceptive patch               Plan:   Continue patches regularly    Continue annual well woman exam.    Breakthrough bleeding on contraceptive patch  -     POCT Wet Prep

## 2023-10-23 ENCOUNTER — PATIENT MESSAGE (OUTPATIENT)
Dept: OBSTETRICS AND GYNECOLOGY | Facility: CLINIC | Age: 23
End: 2023-10-23
Payer: COMMERCIAL

## 2023-10-23 DIAGNOSIS — Z30.45 ENCOUNTER FOR SURVEILLANCE OF TRANSDERMAL PATCH HORMONAL CONTRACEPTIVE DEVICE: ICD-10-CM

## 2023-10-23 DIAGNOSIS — Z01.419 ENCOUNTER FOR GYNECOLOGICAL EXAMINATION WITHOUT ABNORMAL FINDING: ICD-10-CM

## 2023-10-23 RX ORDER — NORELGESTROMIN AND ETHINYL ESTRADIOL 150; 35 UG/D; UG/D
PATCH TRANSDERMAL
Qty: 9 PATCH | Refills: 2 | Status: SHIPPED | OUTPATIENT
Start: 2023-10-23

## 2024-06-13 ENCOUNTER — OFFICE VISIT (OUTPATIENT)
Dept: OBSTETRICS AND GYNECOLOGY | Facility: CLINIC | Age: 24
End: 2024-06-13
Payer: COMMERCIAL

## 2024-06-13 VITALS
HEIGHT: 63 IN | DIASTOLIC BLOOD PRESSURE: 78 MMHG | SYSTOLIC BLOOD PRESSURE: 116 MMHG | WEIGHT: 101 LBS | BODY MASS INDEX: 17.89 KG/M2

## 2024-06-13 DIAGNOSIS — N39.9 URINARY PROBLEM IN FEMALE: ICD-10-CM

## 2024-06-13 DIAGNOSIS — Z11.3 ENCOUNTER FOR SCREENING FOR INFECTIONS WITH PREDOMINANTLY SEXUAL MODE OF TRANSMISSION: ICD-10-CM

## 2024-06-13 DIAGNOSIS — Z72.89 OTHER PROBLEMS RELATED TO LIFESTYLE: ICD-10-CM

## 2024-06-13 DIAGNOSIS — Z30.45 ENCOUNTER FOR SURVEILLANCE OF TRANSDERMAL PATCH HORMONAL CONTRACEPTIVE DEVICE: ICD-10-CM

## 2024-06-13 DIAGNOSIS — Z01.419 ENCOUNTER FOR GYNECOLOGICAL EXAMINATION WITHOUT ABNORMAL FINDING: Primary | ICD-10-CM

## 2024-06-13 PROCEDURE — 87591 N.GONORRHOEAE DNA AMP PROB: CPT | Performed by: NURSE PRACTITIONER

## 2024-06-13 PROCEDURE — 99395 PREV VISIT EST AGE 18-39: CPT | Mod: S$GLB,,, | Performed by: NURSE PRACTITIONER

## 2024-06-13 PROCEDURE — 99999 PR PBB SHADOW E&M-EST. PATIENT-LVL III: CPT | Mod: PBBFAC,,, | Performed by: NURSE PRACTITIONER

## 2024-06-13 PROCEDURE — 87086 URINE CULTURE/COLONY COUNT: CPT | Performed by: NURSE PRACTITIONER

## 2024-06-13 PROCEDURE — 3008F BODY MASS INDEX DOCD: CPT | Mod: CPTII,S$GLB,, | Performed by: NURSE PRACTITIONER

## 2024-06-13 PROCEDURE — 1159F MED LIST DOCD IN RCRD: CPT | Mod: CPTII,S$GLB,, | Performed by: NURSE PRACTITIONER

## 2024-06-13 PROCEDURE — 3078F DIAST BP <80 MM HG: CPT | Mod: CPTII,S$GLB,, | Performed by: NURSE PRACTITIONER

## 2024-06-13 PROCEDURE — 1160F RVW MEDS BY RX/DR IN RCRD: CPT | Mod: CPTII,S$GLB,, | Performed by: NURSE PRACTITIONER

## 2024-06-13 PROCEDURE — 87088 URINE BACTERIA CULTURE: CPT | Performed by: NURSE PRACTITIONER

## 2024-06-13 PROCEDURE — 3074F SYST BP LT 130 MM HG: CPT | Mod: CPTII,S$GLB,, | Performed by: NURSE PRACTITIONER

## 2024-06-13 RX ORDER — NORELGESTROMIN AND ETHINYL ESTRADIOL 35; 150 UG/MG; UG/MG
PATCH TRANSDERMAL
Qty: 9 PATCH | Refills: 3 | Status: SHIPPED | OUTPATIENT
Start: 2024-06-13 | End: 2025-06-13

## 2024-06-13 NOTE — PROGRESS NOTES
Subjective:       Patient ID: Harini Mcbride is a 23 y.o. female.    Chief Complaint:  Well Woman      History of Present Illness  HPI  Annual Exam-Premenopausal  G0 presents for annual exam. The patient has no complaints today. The patient is sexually active; usual partner; no condoms; soreness sometimes after.  Does desire STD testing. GYN screening history: no prior history of gyn screening tests.     Monthly menses x4-5d the week of no patch; medium first day then light. Cramping bad on the first day.  Ibuprofen or two midol with relief.    No bowel issues. Sometimes has the urge intermittently, but nothing comes out.    Working at nursing home    GYN & OB History  Patient's last menstrual period was 2024 (exact date).   Date of Last Pap: No result found    OB History    Para Term  AB Living   0 0 0 0 0 0   SAB IAB Ectopic Multiple Live Births   0 0 0 0 0       Review of Systems  Review of Systems   Constitutional:  Negative for activity change, appetite change, chills, fatigue and fever.   HENT:  Negative for nasal congestion and mouth sores.    Respiratory:  Negative for cough, shortness of breath and wheezing.    Cardiovascular:  Negative for chest pain.   Gastrointestinal:  Negative for abdominal pain, constipation, diarrhea, nausea and vomiting.   Endocrine: Negative for hair loss and hot flashes.   Genitourinary:  Negative for bladder incontinence, decreased libido, dysmenorrhea, dyspareunia, dysuria, frequency, genital sores, hematuria, hot flashes, menorrhagia, menstrual problem, pelvic pain, urgency, vaginal discharge, vaginal pain, urinary incontinence, postcoital bleeding, postmenopausal bleeding, vaginal dryness and vaginal odor.   Musculoskeletal:  Negative for back pain.   Integumentary:  Negative for breast mass, nipple discharge, breast skin changes and breast tenderness.   Neurological:  Negative for headaches.   Hematological:  Negative for adenopathy.    Psychiatric/Behavioral:  Negative for depression and sleep disturbance. The patient is not nervous/anxious.    All other systems reviewed and are negative.  Breast: Negative for breast self exam, lump, mass, mastodynia, nipple discharge, skin changes and tenderness          Objective:      Physical Exam:   Constitutional: She is oriented to person, place, and time. She appears well-developed and well-nourished. No distress.    HENT:   Head: Normocephalic and atraumatic.   Nose: Nose normal.    Eyes: Pupils are equal, round, and reactive to light. Conjunctivae and EOM are normal. Right eye exhibits no discharge. Left eye exhibits no discharge.     Cardiovascular:  Normal rate, regular rhythm and normal heart sounds.      Exam reveals no gallop, no friction rub, no clubbing, no cyanosis and no edema.       No murmur heard.   Pulmonary/Chest: Effort normal and breath sounds normal. No respiratory distress. She has no decreased breath sounds. She has no wheezes. She has no rhonchi. She has no rales. She exhibits no tenderness. Right breast exhibits no inverted nipple, no mass, no nipple discharge, no skin change, no tenderness, no bleeding and no swelling. Left breast exhibits no inverted nipple, no mass, no nipple discharge, no skin change, no tenderness, no bleeding and no swelling. Breasts are symmetrical.        Abdominal: Soft. Bowel sounds are normal. She exhibits no distension. There is no abdominal tenderness. There is no rebound and no guarding. Hernia confirmed negative in the right inguinal area and confirmed negative in the left inguinal area.     Genitourinary:    Inguinal canal normal.   Rectum:      No external hemorrhoid.      Pelvic exam was performed with patient in the lithotomy position.   The external female genitalia was normal.   No external genitalia lesions identified,Genitalia hair distrobution normal .     Labial bartholins normal.There is no rash, tenderness, lesion or injury on the right  labia. There is no rash, tenderness, lesion or injury on the left labia. Cervix is normal. There is vaginal discharge (white, creamy; no odor) in the vagina. No erythema, tenderness or bleeding in the vagina.    No foreign body in the vagina.      No signs of injury in the vagina.   Vagina was moist.Cervix exhibits no lesion, no discharge, no friability and no polyp.    pap smear completedNormal urethral meatus.Urethral Meatus exhibits: urethral lesionUrethra findings: no urethral mass, no tenderness, no urethral scarring and prolapsed   Genitourinary Comments: Bimanual deferred per pt             Musculoskeletal: Normal range of motion and moves all extremeties.      Lymphadenopathy: No inguinal adenopathy noted on the right or left side.    Neurological: She is alert and oriented to person, place, and time.    Skin: Skin is warm and dry. No rash noted. She is not diaphoretic. No cyanosis or erythema. No pallor. Nails show no clubbing.    Psychiatric: She has a normal mood and affect. Her speech is normal and behavior is normal. Judgment and thought content normal.             Assessment:        1. Encounter for gynecological examination without abnormal finding    2. Encounter for surveillance of transdermal patch hormonal contraceptive device    3. Urinary problem in female    4. Encounter for screening for infections with predominantly sexual mode of transmission    5. Other problems related to lifestyle               Plan:   Labs and cx  Ucx    Discussed risks of DVT development with birth control use.  Pt denies history of blood clots, DVT, cardiac issues, HTN, CVA, gallbladder disease, liver disease, smoking, migraines with an aura, or adrenal disease.  Pt denies family hx of DVT.   Refill sent for xulane    Reviewed updated recommendations for pap smears (every 3 years) in low risk patients.  Recommend annual pelvic exams.  Reviewed recommendations for annual CBE.  Next pap due in 2027.   RTC 1 year or  sooner prn.  To PCP for other health maintenance.      Encounter for gynecological examination without abnormal finding  -     norelgestromin-ethinyl estradiol (XULANE) 150-35 mcg/24 hr; APPLY 1 PATCH TOPICALLY TO THE SKIN 1 TIME A WEEK  Dispense: 9 patch; Refill: 3  -     Treponema Pallidium Antibodies IgG, IgM; Future; Expected date: 06/13/2024  -     Hepatitis Panel, Acute; Future; Expected date: 06/13/2024  -     HIV 1/2 Ag/Ab (4th Gen); Future; Expected date: 06/13/2024  -     C. trachomatis/N. gonorrhoeae by AMP DNA  -     Liquid-Based Pap Smear, Screening    Encounter for surveillance of transdermal patch hormonal contraceptive device  -     norelgestromin-ethinyl estradiol (XULANE) 150-35 mcg/24 hr; APPLY 1 PATCH TOPICALLY TO THE SKIN 1 TIME A WEEK  Dispense: 9 patch; Refill: 3    Urinary problem in female  -     Urine culture    Encounter for screening for infections with predominantly sexual mode of transmission  -     Treponema Pallidium Antibodies IgG, IgM; Future; Expected date: 06/13/2024  -     Hepatitis Panel, Acute; Future; Expected date: 06/13/2024  -     HIV 1/2 Ag/Ab (4th Gen); Future; Expected date: 06/13/2024  -     C. trachomatis/N. gonorrhoeae by AMP DNA    Other problems related to lifestyle  -     Treponema Pallidium Antibodies IgG, IgM; Future; Expected date: 06/13/2024  -     Hepatitis Panel, Acute; Future; Expected date: 06/13/2024  -     HIV 1/2 Ag/Ab (4th Gen); Future; Expected date: 06/13/2024  -     C. trachomatis/N. gonorrhoeae by AMP DNA

## 2024-06-14 LAB
C TRACH DNA SPEC QL NAA+PROBE: NOT DETECTED
N GONORRHOEA DNA SPEC QL NAA+PROBE: NOT DETECTED

## 2024-06-15 LAB — BACTERIA UR CULT: ABNORMAL

## 2024-06-17 DIAGNOSIS — A49.8 INFECTION DUE TO DIPHTHEROID BACTERIA: Primary | ICD-10-CM

## 2024-06-17 RX ORDER — CLINDAMYCIN HYDROCHLORIDE 300 MG/1
300 CAPSULE ORAL 2 TIMES DAILY
Qty: 14 CAPSULE | Refills: 0 | Status: SHIPPED | OUTPATIENT
Start: 2024-06-17 | End: 2024-06-24

## 2025-02-27 ENCOUNTER — PATIENT MESSAGE (OUTPATIENT)
Dept: OBSTETRICS AND GYNECOLOGY | Facility: CLINIC | Age: 25
End: 2025-02-27
Payer: COMMERCIAL

## 2025-03-11 ENCOUNTER — APPOINTMENT (OUTPATIENT)
Dept: RADIOLOGY | Facility: HOSPITAL | Age: 25
End: 2025-03-11
Attending: NURSE PRACTITIONER
Payer: COMMERCIAL

## 2025-03-11 ENCOUNTER — RESULTS FOLLOW-UP (OUTPATIENT)
Dept: INTERNAL MEDICINE | Facility: CLINIC | Age: 25
End: 2025-03-11

## 2025-03-11 ENCOUNTER — OFFICE VISIT (OUTPATIENT)
Dept: INTERNAL MEDICINE | Facility: CLINIC | Age: 25
End: 2025-03-11
Payer: COMMERCIAL

## 2025-03-11 VITALS
SYSTOLIC BLOOD PRESSURE: 102 MMHG | OXYGEN SATURATION: 98 % | DIASTOLIC BLOOD PRESSURE: 70 MMHG | BODY MASS INDEX: 17.81 KG/M2 | RESPIRATION RATE: 18 BRPM | HEART RATE: 100 BPM | WEIGHT: 100.5 LBS | HEIGHT: 63 IN | TEMPERATURE: 97 F

## 2025-03-11 DIAGNOSIS — K59.00 CONSTIPATION, UNSPECIFIED CONSTIPATION TYPE: Primary | ICD-10-CM

## 2025-03-11 DIAGNOSIS — K59.00 CONSTIPATION, UNSPECIFIED CONSTIPATION TYPE: ICD-10-CM

## 2025-03-11 DIAGNOSIS — G47.00 INSOMNIA, UNSPECIFIED TYPE: ICD-10-CM

## 2025-03-11 DIAGNOSIS — Z01.89 ROUTINE LAB DRAW: ICD-10-CM

## 2025-03-11 DIAGNOSIS — R14.0 ABDOMINAL BLOATING: ICD-10-CM

## 2025-03-11 PROCEDURE — 3078F DIAST BP <80 MM HG: CPT | Mod: CPTII,S$GLB,, | Performed by: NURSE PRACTITIONER

## 2025-03-11 PROCEDURE — 1159F MED LIST DOCD IN RCRD: CPT | Mod: CPTII,S$GLB,, | Performed by: NURSE PRACTITIONER

## 2025-03-11 PROCEDURE — 3074F SYST BP LT 130 MM HG: CPT | Mod: CPTII,S$GLB,, | Performed by: NURSE PRACTITIONER

## 2025-03-11 PROCEDURE — 74019 RADEX ABDOMEN 2 VIEWS: CPT | Mod: 26,,, | Performed by: RADIOLOGY

## 2025-03-11 PROCEDURE — 99999 PR PBB SHADOW E&M-EST. PATIENT-LVL IV: CPT | Mod: PBBFAC,,, | Performed by: NURSE PRACTITIONER

## 2025-03-11 PROCEDURE — 99214 OFFICE O/P EST MOD 30 MIN: CPT | Mod: S$GLB,,, | Performed by: NURSE PRACTITIONER

## 2025-03-11 PROCEDURE — 3008F BODY MASS INDEX DOCD: CPT | Mod: CPTII,S$GLB,, | Performed by: NURSE PRACTITIONER

## 2025-03-11 PROCEDURE — 74019 RADEX ABDOMEN 2 VIEWS: CPT | Mod: TC,PN

## 2025-03-11 PROCEDURE — 1160F RVW MEDS BY RX/DR IN RCRD: CPT | Mod: CPTII,S$GLB,, | Performed by: NURSE PRACTITIONER

## 2025-03-11 NOTE — PROGRESS NOTES
Patient ID: Harini Mcbride is a 24 y.o. female.    Chief Complaint: Constipation (1 mo w/ abdominal cramping), Urinary Tract Infection (Burning w/ voiding x 1 mo ), and Gas (1 mo)    History of Present Illness    CHIEF COMPLAINT:  Ms. Mcbride presents today with concerns of changes in bowel habits    GASTROINTESTINAL:  She reports experiencing abdominal cramping and bloating over the past month. She notes changes in bowel habits with smaller stools and constipation.    GENITOURINARY:  She reports burning sensation with urination. She denies continuous discomfort or vaginal issues.    SLEEP AND FATIGUE:  She reports difficulty falling asleep despite avoiding phone use at night. She denies use of sleep aids.    DIET:  Her diet consists of home meals with occasional fast food consumption.      ROS:  Constitutional: negative diminished activity, negative appetite change, negative fatigue, negative fever  HENT: negative ear discharge, negative ear pain, negative mouth sores, negative nosebleeds, negative sore throat, negative tinnitus  Respiratory: negative apnea, negative cough, negative shortness of breath  Cardiovascular: negative chest pain, negative leg swelling  Gastrointestinal: negative abdominal distention, negative abdominal pain, negative blood in stool, POSITIVE CONSTIPATION, negative diarrhea, negative nausea, negative vomiting, POSITIVE CHANGE IN BOWEL HABITS, POSITIVE BLOATING  Endocrine: negative polydipsia, negative polyphagia, negative polyuria  Genitourinary: negative difficulty urinating, negative flank pain, negative frequency, negative hematuria, POSITIVE PAINFUL URINATION  Musculoskeletal: negative back pain, negative abnormal gait, negative neck pain, negative neck stiffness, negative joint pain, negative muscle pain  Neurological: negative dizziness, negative seizures, negative numbness, negative tingling, negative headaches, negative balance issues  Psychiatric: negative agitation, negative  confusion, negative hallucinations, POSITIVE SLEEP DIFFICULTY, negative suicidal ideation         Physical Exam    Vital Signs: Reviewed.  Constitutional: Well-appearing. Well-developed. No acute distress.  HENT: Normocephalic. Tympanic membranes normal bilaterally. Nares patent. Oropharynx clear. No erythema. No exudate.  Cardiovascular: Normal rate and regular rhythm. Normal heart sounds.  Pulmonary: Pulmonary effort is normal. Normal breath sounds.  Abdominal: Bowel sounds are normal. Abdomen is soft. No tenderness.  Musculoskeletal: No muscle tenderness. No joint tenderness. Normal range of motion.  Skin: Warm. Dry.  Neurological: No focal deficit is present. Alert and oriented to person, place, and time.  Psychiatric: Mood is normal. Behavior is normal. Behavior is cooperative.         Assessment & Plan      GASTROINTESTINAL ISSUES:  - Ms. Mcbride reports changes in bowel habits, including constipation and smaller stools over the past month.  - Ms. Mcbride experiences abdominal cramping and bloating.  - Ordered flat and erect abdominal X-ray to evaluate the condition.  - Advised the patient to increase water intake and fiber in diet.  - Ms. Mcbride reports abdominal cramping and bloating over the past month.  - Ms. Mcbride to increase water intake.  - Ms. Mcbride to increase fiber in diet.    URINARY TRACT SYMPTOMS:  - Ms. Mcbride reports burning sensation during urination.  - Ordered urinalysis to evaluate the condition.    SEXUALLY TRANSMITTED INFECTION SCREENING:  - Ordered chlamydia and gonorrhea testing to rule out sexually transmitted infections.    SLEEP ISSUES:  - Assessed sleep patterns and fatigue.  - Ms. Mcbride reports not sleeping well and experiencing fatigue.  - Ms. Mcbride sometimes lies in bed unable to sleep, not using phone at night.  - Ms. Mcbride denies taking anything for sleep but plans to start soon.    STRESS MANAGEMENT:  - Evaluated recent life stressors (breakup, school) as possible contributors to  symptoms.    LABS:  - Ordered CBC, CMP, TSH.    FOLLOW UP:  - Scheduled follow-up visit in 1 month.  - Follow up in 1 month.            Follow up in about 1 month (around 4/11/2025).    This note was generated with the assistance of ambient listening technology. Verbal consent was obtained by the patient and accompanying visitor(s) for the recording of patient appointment to facilitate this note. I attest to having reviewed and edited the generated note for accuracy, though some syntax or spelling errors may persist. Please contact the author of this note for any clarification.

## 2025-03-11 NOTE — LETTER
March 11, 2025    Harini Mcbride  [address] [address]             [facility]  Internal Medicine  [address] [place] [id number]  Phone: [phone]  Fax: [phone]   March 11, 2025     Patient: Harini Mcbride   YOB: 2000   Date of Visit: 3/11/2025       To Whom it May Concern:    Harini Mcbride was seen in my clinic on 3/11/2025. She may return to school on 03/12/2025 .    Please excuse her from any classes missed today.    If you have any questions or concerns, please don't hesitate to call.    Sincerely,          Luisana Rivera NP

## 2025-03-12 ENCOUNTER — RESULTS FOLLOW-UP (OUTPATIENT)
Dept: OBSTETRICS AND GYNECOLOGY | Facility: CLINIC | Age: 25
End: 2025-03-12

## 2025-03-18 ENCOUNTER — TELEPHONE (OUTPATIENT)
Dept: OBSTETRICS AND GYNECOLOGY | Facility: CLINIC | Age: 25
End: 2025-03-18
Payer: COMMERCIAL

## 2025-03-18 NOTE — TELEPHONE ENCOUNTER
Copied from CRM #[id number]. Topic: General Inquiry - Return Call  >> Mar 18, 2025 10:30 AM Emilee wrote:  Type:  Patient Returning Call    Who Called:pt  Who Left Message for Patient:na  Does the patient know what this is regarding?:results  Would the patient rather a call back or a response via [facility]? call  Best Call Back Number:[phone]   Additional Information: returning missed call

## 2025-03-18 NOTE — TELEPHONE ENCOUNTER
Pt called back in regard to lab results on 3/11/25  Lab were done by PCP Luisana Rivera, NP      Elenita LORA, LPN  OB/GYN

## 2025-04-11 ENCOUNTER — OFFICE VISIT (OUTPATIENT)
Dept: INTERNAL MEDICINE | Facility: CLINIC | Age: 25
End: 2025-04-11
Payer: COMMERCIAL

## 2025-04-11 VITALS
RESPIRATION RATE: 18 BRPM | TEMPERATURE: 98 F | HEIGHT: 63 IN | DIASTOLIC BLOOD PRESSURE: 66 MMHG | WEIGHT: 101 LBS | OXYGEN SATURATION: 97 % | SYSTOLIC BLOOD PRESSURE: 102 MMHG | BODY MASS INDEX: 17.89 KG/M2 | HEART RATE: 109 BPM

## 2025-04-11 DIAGNOSIS — R53.83 FATIGUE, UNSPECIFIED TYPE: ICD-10-CM

## 2025-04-11 DIAGNOSIS — G47.00 INSOMNIA, UNSPECIFIED TYPE: ICD-10-CM

## 2025-04-11 DIAGNOSIS — Z09 FOLLOW-UP EXAM: Primary | ICD-10-CM

## 2025-04-11 PROCEDURE — 99214 OFFICE O/P EST MOD 30 MIN: CPT | Mod: S$GLB,,, | Performed by: NURSE PRACTITIONER

## 2025-04-11 PROCEDURE — 3074F SYST BP LT 130 MM HG: CPT | Mod: CPTII,S$GLB,, | Performed by: NURSE PRACTITIONER

## 2025-04-11 PROCEDURE — 3078F DIAST BP <80 MM HG: CPT | Mod: CPTII,S$GLB,, | Performed by: NURSE PRACTITIONER

## 2025-04-11 PROCEDURE — 1159F MED LIST DOCD IN RCRD: CPT | Mod: CPTII,S$GLB,, | Performed by: NURSE PRACTITIONER

## 2025-04-11 PROCEDURE — 99999 PR PBB SHADOW E&M-EST. PATIENT-LVL IV: CPT | Mod: PBBFAC,,, | Performed by: NURSE PRACTITIONER

## 2025-04-11 PROCEDURE — 3008F BODY MASS INDEX DOCD: CPT | Mod: CPTII,S$GLB,, | Performed by: NURSE PRACTITIONER

## 2025-04-11 PROCEDURE — 1160F RVW MEDS BY RX/DR IN RCRD: CPT | Mod: CPTII,S$GLB,, | Performed by: NURSE PRACTITIONER

## 2025-04-11 RX ORDER — HYDROXYZINE PAMOATE 25 MG/1
25 CAPSULE ORAL NIGHTLY PRN
Qty: 30 CAPSULE | Refills: 1 | Status: SHIPPED | OUTPATIENT
Start: 2025-04-11

## 2025-04-11 NOTE — PROGRESS NOTES
Patient ID: Harini Mcbride is a 24 y.o. female.    Chief Complaint: Follow-up (1 mo)    History of Present Illness    CHIEF COMPLAINT:  Ms. Mcbride presents today for follow up of abdominal cramping, bloating, and sleep difficulties.    GASTROINTESTINAL:  She reports improvement in abdominal discomfort and constipation.    SLEEP:  She experiences fatigue due to inadequate sleep, maintaining a schedule of 5:30am wake time and 11:00pm-12:00am bedtime due to work requirements.    LABS:  Metabolic panel, CBC, and thyroid studies were stable in March.      ROS:  Constitutional: negative diminished activity, negative appetite change, POSITIVE FATIGUE, negative fever  HENT: negative ear discharge, negative ear pain, negative mouth sores, negative nosebleeds, negative sore throat, negative tinnitus  Respiratory: negative apnea, negative cough, negative shortness of breath  Cardiovascular: negative chest pain, negative leg swelling  Gastrointestinal: negative abdominal distention, negative abdominal pain, negative blood in stool, negative constipation, negative diarrhea, negative nausea, negative vomiting  Genitourinary: negative difficulty urinating, negative flank pain, negative frequency, negative hematuria  Musculoskeletal: negative back pain, negative abnormal gait, negative neck pain, negative neck stiffness, negative joint pain, negative muscle pain  Skin: negative rash, negative wound, negative abnormal moles  Neurological: negative dizziness, negative seizures, negative numbness, negative tingling, negative headaches, negative balance issues, POSITIVE DIFFICULTY FALLING ASLEEP  Psychiatric: negative agitation, negative confusion, negative hallucinations, POSITIVE SLEEP DIFFICULTY, negative suicidal ideation         Physical Exam    Vital Signs: Reviewed.  Constitutional: Well-appearing. Well-developed. No acute distress.  Cardiovascular: Normal rate and regular rhythm. Normal heart sounds.  Pulmonary: Pulmonary  effort is normal. Normal breath sounds.  Abdominal: Bowel sounds are normal. Abdomen is soft. No tenderness.  Musculoskeletal: No muscle tenderness. No joint tenderness. Normal range of motion.  Skin: Warm. Dry.  Neurological: No focal deficit is present. Alert and oriented to person, place, and time.  Psychiatric: Mood is normal. Behavior is normal. Behavior is cooperative.         Assessment & Plan      SLEEP DISORDER AND FATIGUE:  - Identified persistent fatigue due to inadequate sleep, with the patient going to bed around 11-12 PM and waking at 5:30 AM for work.  - Diagnosed the patient with insomnia.  - Prescribed Vistaril 25 mg capsules to be taken at bedtime as needed to help with sleep.  - Identified persistent fatigue due to inadequate sleep at night.  - Prescribed Vistaril 25 mg capsules to be taken at bedtime as needed to address daytime fatigue.  - Confirmed routine labs including metabolic panel, CBC, and thyroid were stable.  - Advised the patient to report if fatigue persists despite the prescribed treatment.    CONSTIPATION AND ABDOMINAL DISCOMFORT:  - Noted improvement in constipation from previous concerns.  - Assessed follow-up for abdominal cramping and bloating.  - Noted improvement in abdominal discomfort from previous concerns.    FOLLOW-UP:  - Scheduled a follow-up visit in 2 months to assess improvement in fatigue.              Follow up in about 2 months (around 6/11/2025).    This note was generated with the assistance of ambient listening technology. Verbal consent was obtained by the patient and accompanying visitor(s) for the recording of patient appointment to facilitate this note. I attest to having reviewed and edited the generated note for accuracy, though some syntax or spelling errors may persist. Please contact the author of this note for any clarification.    Still fatigue.  Still going to sleep a little late--still tired throughout the day    7 am work.  Get up around 5:30.   Going to sleep around 11-pm -12 am.

## 2025-05-07 DIAGNOSIS — Z30.45 ENCOUNTER FOR SURVEILLANCE OF TRANSDERMAL PATCH HORMONAL CONTRACEPTIVE DEVICE: ICD-10-CM

## 2025-05-07 DIAGNOSIS — Z01.419 ENCOUNTER FOR GYNECOLOGICAL EXAMINATION WITHOUT ABNORMAL FINDING: ICD-10-CM

## 2025-05-07 RX ORDER — NORELGESTROMIN AND ETHINYL ESTRADIOL 35; 150 UG/MG; UG/MG
PATCH TRANSDERMAL
Qty: 9 PATCH | Refills: 0 | Status: SHIPPED | OUTPATIENT
Start: 2025-05-07 | End: 2026-05-07

## 2025-06-10 ENCOUNTER — OFFICE VISIT (OUTPATIENT)
Dept: INTERNAL MEDICINE | Facility: CLINIC | Age: 25
End: 2025-06-10
Payer: COMMERCIAL

## 2025-06-10 ENCOUNTER — LAB VISIT (OUTPATIENT)
Dept: LAB | Facility: HOSPITAL | Age: 25
End: 2025-06-10
Attending: NURSE PRACTITIONER
Payer: COMMERCIAL

## 2025-06-10 VITALS
WEIGHT: 102.19 LBS | TEMPERATURE: 97 F | RESPIRATION RATE: 18 BRPM | BODY MASS INDEX: 18.11 KG/M2 | HEIGHT: 63 IN | HEART RATE: 101 BPM | OXYGEN SATURATION: 96 % | DIASTOLIC BLOOD PRESSURE: 72 MMHG | SYSTOLIC BLOOD PRESSURE: 96 MMHG

## 2025-06-10 DIAGNOSIS — Z09 FOLLOW-UP EXAM: Primary | ICD-10-CM

## 2025-06-10 DIAGNOSIS — Z72.89 OTHER PROBLEMS RELATED TO LIFESTYLE: ICD-10-CM

## 2025-06-10 DIAGNOSIS — N76.1 CHRONIC VAGINITIS: ICD-10-CM

## 2025-06-10 DIAGNOSIS — Z01.419 ENCOUNTER FOR GYNECOLOGICAL EXAMINATION WITHOUT ABNORMAL FINDING: ICD-10-CM

## 2025-06-10 DIAGNOSIS — R53.83 FATIGUE, UNSPECIFIED TYPE: ICD-10-CM

## 2025-06-10 DIAGNOSIS — L30.9 FACIAL DERMATITIS: ICD-10-CM

## 2025-06-10 DIAGNOSIS — Z11.3 ENCOUNTER FOR SCREENING FOR INFECTIONS WITH PREDOMINANTLY SEXUAL MODE OF TRANSMISSION: ICD-10-CM

## 2025-06-10 LAB
EAG (OHS): 88 MG/DL (ref 68–131)
HBA1C MFR BLD: 4.7 % (ref 4–5.6)
T PALLIDUM IGG+IGM SER QL: NORMAL

## 2025-06-10 PROCEDURE — 99213 OFFICE O/P EST LOW 20 MIN: CPT | Mod: S$GLB,,, | Performed by: NURSE PRACTITIONER

## 2025-06-10 PROCEDURE — 99999 PR PBB SHADOW E&M-EST. PATIENT-LVL IV: CPT | Mod: PBBFAC,,, | Performed by: NURSE PRACTITIONER

## 2025-06-10 PROCEDURE — 3074F SYST BP LT 130 MM HG: CPT | Mod: CPTII,S$GLB,, | Performed by: NURSE PRACTITIONER

## 2025-06-10 PROCEDURE — 3008F BODY MASS INDEX DOCD: CPT | Mod: CPTII,S$GLB,, | Performed by: NURSE PRACTITIONER

## 2025-06-10 PROCEDURE — 1159F MED LIST DOCD IN RCRD: CPT | Mod: CPTII,S$GLB,, | Performed by: NURSE PRACTITIONER

## 2025-06-10 PROCEDURE — 36415 COLL VENOUS BLD VENIPUNCTURE: CPT | Mod: PN

## 2025-06-10 PROCEDURE — 3044F HG A1C LEVEL LT 7.0%: CPT | Mod: CPTII,S$GLB,, | Performed by: NURSE PRACTITIONER

## 2025-06-10 PROCEDURE — 1160F RVW MEDS BY RX/DR IN RCRD: CPT | Mod: CPTII,S$GLB,, | Performed by: NURSE PRACTITIONER

## 2025-06-10 PROCEDURE — 86593 SYPHILIS TEST NON-TREP QUANT: CPT

## 2025-06-10 PROCEDURE — 83036 HEMOGLOBIN GLYCOSYLATED A1C: CPT

## 2025-06-10 PROCEDURE — 3078F DIAST BP <80 MM HG: CPT | Mod: CPTII,S$GLB,, | Performed by: NURSE PRACTITIONER

## 2025-06-11 ENCOUNTER — RESULTS FOLLOW-UP (OUTPATIENT)
Dept: INTERNAL MEDICINE | Facility: CLINIC | Age: 25
End: 2025-06-11

## 2025-06-16 NOTE — PROGRESS NOTES
Subjective     Patient ID: Harini Mcbride is a 24 y.o. female.    Chief Complaint: Follow-up (2 mo weight )    Patient presents for follow up.      Last visit we discussed insomnia and fatigue.  Was started on Vistaril 25 mg at bedtime.  Reports improvement in sleep and decrease fatigue during the day.     Has concern of chronic vaginitis/yeast.  Want to be tested for diabetes.  Routine labs show normal blood glucose.  Last GYN visit 06/2024    Patient noticed facial rash--papular.      Follow-up  Associated symptoms include a rash. Pertinent negatives include no abdominal pain, chest pain, coughing, fatigue, fever, headaches, neck pain, numbness or sore throat.     Review of Systems   Constitutional:  Negative for activity change, appetite change, fatigue and fever.   HENT:  Negative for ear discharge, ear pain, mouth sores, nosebleeds, sore throat and tinnitus.    Eyes:  Negative for discharge, redness and itching.   Respiratory:  Negative for apnea, cough and shortness of breath.    Cardiovascular:  Negative for chest pain and leg swelling.   Gastrointestinal:  Negative for abdominal distention, abdominal pain, blood in stool and constipation.   Endocrine: Negative for polydipsia, polyphagia and polyuria.   Genitourinary:  Negative for difficulty urinating, flank pain, frequency and hematuria.   Musculoskeletal:  Negative for back pain, gait problem, neck pain and neck stiffness.   Integumentary:  Positive for rash. Negative for wound.   Allergic/Immunologic: Negative for environmental allergies, food allergies and immunocompromised state.   Neurological:  Negative for dizziness, seizures, numbness and headaches.   Psychiatric/Behavioral:  Positive for sleep disturbance. Negative for agitation, confusion, hallucinations, self-injury and suicidal ideas.           Objective     Physical Exam  Vitals reviewed.   Constitutional:       Appearance: She is well-developed.   HENT:      Head: Normocephalic.    Cardiovascular:      Rate and Rhythm: Normal rate and regular rhythm.   Pulmonary:      Effort: Pulmonary effort is normal.      Breath sounds: Normal breath sounds.   Musculoskeletal:         General: Normal range of motion.   Skin:     General: Skin is warm and dry.      Findings: Rash present.   Neurological:      General: No focal deficit present.      Mental Status: She is alert and oriented to person, place, and time.   Psychiatric:         Mood and Affect: Mood normal.         Behavior: Behavior normal. Behavior is cooperative.            Assessment and Plan     1. Follow-up exam    2. Chronic vaginitis  Comments:  A1C today.  Recommend annual well woman exam with GYN  Orders:  -     Hemoglobin A1C; Future; Expected date: 06/10/2025    3. Fatigue, unspecified type  Comments:  Improved--continue Vistrail at bedime.    4. Facial dermatitis  Comments:  Start Zyrtec.             No follow-ups on file.

## 2025-06-18 ENCOUNTER — OFFICE VISIT (OUTPATIENT)
Dept: OBSTETRICS AND GYNECOLOGY | Facility: CLINIC | Age: 25
End: 2025-06-18
Payer: COMMERCIAL

## 2025-06-18 ENCOUNTER — LAB VISIT (OUTPATIENT)
Dept: LAB | Facility: HOSPITAL | Age: 25
End: 2025-06-18
Attending: NURSE PRACTITIONER
Payer: COMMERCIAL

## 2025-06-18 VITALS
SYSTOLIC BLOOD PRESSURE: 110 MMHG | BODY MASS INDEX: 18.32 KG/M2 | DIASTOLIC BLOOD PRESSURE: 72 MMHG | WEIGHT: 103.38 LBS | HEIGHT: 63 IN

## 2025-06-18 DIAGNOSIS — N76.0 ACUTE VAGINITIS: ICD-10-CM

## 2025-06-18 DIAGNOSIS — Z11.3 ENCOUNTER FOR SCREENING FOR INFECTIONS WITH PREDOMINANTLY SEXUAL MODE OF TRANSMISSION: ICD-10-CM

## 2025-06-18 DIAGNOSIS — Z72.89 OTHER PROBLEMS RELATED TO LIFESTYLE: ICD-10-CM

## 2025-06-18 DIAGNOSIS — Z01.419 ENCOUNTER FOR GYNECOLOGICAL EXAMINATION WITHOUT ABNORMAL FINDING: Primary | ICD-10-CM

## 2025-06-18 DIAGNOSIS — Z30.45 ENCOUNTER FOR SURVEILLANCE OF TRANSDERMAL PATCH HORMONAL CONTRACEPTIVE DEVICE: ICD-10-CM

## 2025-06-18 DIAGNOSIS — Z01.419 ENCOUNTER FOR GYNECOLOGICAL EXAMINATION WITHOUT ABNORMAL FINDING: ICD-10-CM

## 2025-06-18 LAB
HAV IGM SERPL QL IA: NORMAL
HBV CORE IGM SERPL QL IA: NORMAL
HBV SURFACE AG SERPL QL IA: NORMAL
HCV AB SERPL QL IA: NORMAL
HIV 1+2 AB+HIV1 P24 AG SERPL QL IA: NORMAL
T PALLIDUM IGG+IGM SER QL: NORMAL

## 2025-06-18 PROCEDURE — 3078F DIAST BP <80 MM HG: CPT | Mod: CPTII,S$GLB,, | Performed by: NURSE PRACTITIONER

## 2025-06-18 PROCEDURE — 1160F RVW MEDS BY RX/DR IN RCRD: CPT | Mod: CPTII,S$GLB,, | Performed by: NURSE PRACTITIONER

## 2025-06-18 PROCEDURE — 87389 HIV-1 AG W/HIV-1&-2 AB AG IA: CPT

## 2025-06-18 PROCEDURE — 87491 CHLMYD TRACH DNA AMP PROBE: CPT | Performed by: NURSE PRACTITIONER

## 2025-06-18 PROCEDURE — 3044F HG A1C LEVEL LT 7.0%: CPT | Mod: CPTII,S$GLB,, | Performed by: NURSE PRACTITIONER

## 2025-06-18 PROCEDURE — 3008F BODY MASS INDEX DOCD: CPT | Mod: CPTII,S$GLB,, | Performed by: NURSE PRACTITIONER

## 2025-06-18 PROCEDURE — 99395 PREV VISIT EST AGE 18-39: CPT | Mod: S$GLB,,, | Performed by: NURSE PRACTITIONER

## 2025-06-18 PROCEDURE — 81515 NFCT DS BV&VAGINITIS DNA ALG: CPT | Performed by: NURSE PRACTITIONER

## 2025-06-18 PROCEDURE — 1159F MED LIST DOCD IN RCRD: CPT | Mod: CPTII,S$GLB,, | Performed by: NURSE PRACTITIONER

## 2025-06-18 PROCEDURE — 3074F SYST BP LT 130 MM HG: CPT | Mod: CPTII,S$GLB,, | Performed by: NURSE PRACTITIONER

## 2025-06-18 PROCEDURE — 86593 SYPHILIS TEST NON-TREP QUANT: CPT

## 2025-06-18 PROCEDURE — 80074 ACUTE HEPATITIS PANEL: CPT

## 2025-06-18 PROCEDURE — 36415 COLL VENOUS BLD VENIPUNCTURE: CPT | Mod: PN

## 2025-06-18 PROCEDURE — 99999 PR PBB SHADOW E&M-EST. PATIENT-LVL III: CPT | Mod: PBBFAC,,, | Performed by: NURSE PRACTITIONER

## 2025-06-18 RX ORDER — NORELGESTROMIN AND ETHINYL ESTRADIOL 35; 150 UG/MG; UG/MG
PATCH TRANSDERMAL
Qty: 9 PATCH | Refills: 4 | Status: SHIPPED | OUTPATIENT
Start: 2025-06-18 | End: 2026-06-18

## 2025-06-18 NOTE — PROGRESS NOTES
Subjective:       Patient ID: Harini Mcbride is a 24 y.o. female.    Chief Complaint:  Annual Exam      History of Present Illness  HPI  Annual Exam-Premenopausal  G0 presents for annual exam.  The patient was sexually active about 3 weeks ago with usual partner, but used condom. They aren't together anymore.  Desires STD testing.   GYN screening history: last pap: approximate date 2024 and was normal. The patient wears seatbelts: yes. The patient participates in regular exercise: no. Has the patient ever been transfused or tattooed?: yes. Tattoos.  The patient reports that there is not domestic violence in her life.    Feels like she has yeast a lot.  Unsure if she had it recently. Her discharge is always the same.  Increased, but no irritation today.  Showers with dove sensitive.    Cramping; cycle will start soon.  First day is bad; ibuprofen with relief.  Menses the week of no patch x5d; second day heaviest; pads; overnight changing q4h for cleanliness.  No clots for the last year.  Unsure if she wants to continue, but needs refill for now.      No bladder/bowel issues.    GYN & OB History  Patient's last menstrual period was 2025 (exact date).   Date of Last Pap: 2024    OB History    Para Term  AB Living   0 0 0 0 0 0   SAB IAB Ectopic Multiple Live Births   0 0 0 0 0       Review of Systems  Review of Systems   Constitutional:  Negative for activity change, appetite change, chills, fatigue and fever.   HENT:  Negative for nasal congestion and mouth sores.    Respiratory:  Negative for cough, shortness of breath and wheezing.    Cardiovascular:  Negative for chest pain.   Gastrointestinal:  Negative for abdominal pain, constipation, diarrhea, nausea and vomiting.   Endocrine: Negative for hair loss and hot flashes.   Genitourinary:  Positive for dysmenorrhea. Negative for bladder incontinence, decreased libido, dyspareunia, dysuria, frequency, genital sores, hematuria, hot  flashes, menorrhagia, menstrual problem, pelvic pain, urgency, vaginal discharge, vaginal pain, urinary incontinence, postcoital bleeding, postmenopausal bleeding, vaginal dryness and vaginal odor.   Musculoskeletal:  Negative for back pain.   Integumentary:  Negative for breast mass, nipple discharge, breast skin changes and breast tenderness.   Neurological:  Negative for headaches.   Hematological:  Negative for adenopathy.   Psychiatric/Behavioral:  Negative for depression and sleep disturbance. The patient is not nervous/anxious.    All other systems reviewed and are negative.  Breast: Negative for breast self exam, lump, mass, mastodynia, nipple discharge, skin changes and tenderness          Objective:      Physical Exam:   Constitutional: She is oriented to person, place, and time. She appears well-developed and well-nourished. No distress.    HENT:   Head: Normocephalic and atraumatic.   Nose: Nose normal.    Eyes: Pupils are equal, round, and reactive to light. Conjunctivae and EOM are normal. Right eye exhibits no discharge. Left eye exhibits no discharge.     Cardiovascular:  Normal rate, regular rhythm and normal heart sounds.      Exam reveals no gallop, no friction rub, no clubbing, no cyanosis and no edema.       No murmur heard.   Pulmonary/Chest: Effort normal and breath sounds normal. No respiratory distress. She has no decreased breath sounds. She has no wheezes. She has no rhonchi. She has no rales. She exhibits no tenderness. Right breast exhibits no inverted nipple, no mass, no nipple discharge, no skin change, no tenderness, no bleeding and no swelling. Left breast exhibits no inverted nipple, no mass, no nipple discharge, no skin change, no tenderness, no bleeding and no swelling. Breasts are symmetrical.        Abdominal: Soft. Bowel sounds are normal. She exhibits no distension. There is no abdominal tenderness. There is no rebound and no guarding. Hernia confirmed negative in the right  inguinal area and confirmed negative in the left inguinal area.     Genitourinary:    Inguinal canal normal.   Rectum:      No external hemorrhoid.      Pelvic exam was performed with patient in the lithotomy position.   The external female genitalia was normal.   No external genitalia lesions identified,Genitalia hair distrobution normal .     Labial bartholins normal.There is no rash, tenderness, lesion or injury on the right labia. There is no rash, tenderness, lesion or injury on the left labia. Cervix is normal. There is vaginal discharge (white, creamy; no odor; small amt) in the vagina. No erythema, tenderness or bleeding in the vagina.    No foreign body in the vagina.      No signs of injury in the vagina.   Vagina was moist.Cervix exhibits no lesion, no discharge, no friability and no polyp.    pap smear not completedNormal urethral meatus.Urethral Meatus exhibits: no urethral lesionUrethra findings: no urethral mass, no tenderness, no urethral scarring and no prolapsed          Musculoskeletal: Normal range of motion and moves all extremeties.      Lymphadenopathy: No inguinal adenopathy noted on the right or left side.    Neurological: She is alert and oriented to person, place, and time.    Skin: Skin is warm and dry. No rash noted. She is not diaphoretic. No cyanosis or erythema. No pallor. Nails show no clubbing.    Psychiatric: She has a normal mood and affect. Her speech is normal and behavior is normal. Judgment and thought content normal.             Assessment:        1. Encounter for gynecological examination without abnormal finding    2. Acute vaginitis    3. Encounter for screening for infections with predominantly sexual mode of transmission    4. Other problems related to lifestyle    5. Encounter for surveillance of transdermal patch hormonal contraceptive device               Plan:   Labs and cx    Discussed risks of DVT development with birth control use.  Pt denies history of blood  clots, DVT, cardiac issues, HTN, CVA, gallbladder disease, liver disease, smoking, migraines with an aura, or adrenal disease.  Pt denies family hx of DVT.   Refill sent for xulane  Safe sex practices discussed.    Reviewed updated recommendations for pap smears (every 3 years) in low risk patients.  Recommend annual pelvic exams.  Reviewed recommendations for annual CBE.  Next pap due in 2027.   RTC 1 year or sooner prn.  To PCP for other health maintenance.      Encounter for gynecological examination without abnormal finding  -     C. trachomatis/N. gonorrhoeae by AMP DNA  -     Treponema Pallidium Antibodies IgG, IgM; Future; Expected date: 06/18/2025  -     Hepatitis Panel, Acute; Future; Expected date: 06/18/2025  -     HIV 1/2 Ag/Ab (4th Gen); Future; Expected date: 06/18/2025  -     norelgestromin-ethinyl estradiol (XULANE) 150-35 mcg/24 hr; APPLY 1 PATCH TOPICALLY TO THE SKIN 1 TIME A WEEK. No patch on the fourth week.  Dispense: 9 patch; Refill: 4    Acute vaginitis  -     Vaginosis Screen by DNA Probe    Encounter for screening for infections with predominantly sexual mode of transmission  -     C. trachomatis/N. gonorrhoeae by AMP DNA  -     Treponema Pallidium Antibodies IgG, IgM; Future; Expected date: 06/18/2025  -     Hepatitis Panel, Acute; Future; Expected date: 06/18/2025  -     HIV 1/2 Ag/Ab (4th Gen); Future; Expected date: 06/18/2025    Other problems related to lifestyle  -     C. trachomatis/N. gonorrhoeae by AMP DNA  -     Treponema Pallidium Antibodies IgG, IgM; Future; Expected date: 06/18/2025  -     Hepatitis Panel, Acute; Future; Expected date: 06/18/2025  -     HIV 1/2 Ag/Ab (4th Gen); Future; Expected date: 06/18/2025    Encounter for surveillance of transdermal patch hormonal contraceptive device  -     norelgestromin-ethinyl estradiol (XULANE) 150-35 mcg/24 hr; APPLY 1 PATCH TOPICALLY TO THE SKIN 1 TIME A WEEK. No patch on the fourth week.  Dispense: 9 patch; Refill: 4

## 2025-06-19 ENCOUNTER — RESULTS FOLLOW-UP (OUTPATIENT)
Dept: OBSTETRICS AND GYNECOLOGY | Facility: CLINIC | Age: 25
End: 2025-06-19

## 2025-06-20 LAB
C TRACH DNA SPEC QL NAA+PROBE: NOT DETECTED
CTGC SOURCE (OHS) ORD-325: NORMAL
N GONORRHOEA DNA UR QL NAA+PROBE: NOT DETECTED

## 2025-06-21 LAB
BACTERIAL VAGINOSIS DNA (OHS): NOT DETECTED
CANDIDA GLABRATA/KRUSEI DNA (OHS): NOT DETECTED
CANDIDA SPECIES DNA (OHS): NOT DETECTED
TRICHOMONAS VAGINALIS DNA (OHS): NOT DETECTED